# Patient Record
Sex: FEMALE | Race: WHITE | NOT HISPANIC OR LATINO | Employment: FULL TIME | URBAN - METROPOLITAN AREA
[De-identification: names, ages, dates, MRNs, and addresses within clinical notes are randomized per-mention and may not be internally consistent; named-entity substitution may affect disease eponyms.]

---

## 2017-06-03 ENCOUNTER — NON-PROVIDER VISIT (OUTPATIENT)
Dept: OCCUPATIONAL MEDICINE | Facility: CLINIC | Age: 32
End: 2017-06-03

## 2017-06-03 DIAGNOSIS — Z02.1 DRUG TESTING, PRE-EMPLOYMENT: ICD-10-CM

## 2017-06-03 PROCEDURE — 8907 PR URINE COLLECT ONLY: Performed by: PREVENTIVE MEDICINE

## 2017-06-03 NOTE — MR AVS SNAPSHOT
Celeste Hernandez   6/3/2017 1:05 PM   Non-Provider Visit   MRN: [id number]    Department:  [facility]   Dept Phone:  [phone]    Description:  Female : 1985   Provider:  GEGE MEJIA MA           Reason for Visit     Drug / Alcohol Assessment RIDE RIGHT      Allergies as of 6/3/2017     Allergen Noted Reactions    Contrast Media With Iodine [Iodine] 10/12/2009   Rash      You were diagnosed with     Drug testing, pre-employment   [185715]         Vital Signs     Smoking Status                   Current Every Day Smoker           Basic Information     Date Of Birth Sex Race Ethnicity Preferred Language    1985 Female White Non- English      Your appointments     2017  1:05 PM   Occupational Health Drug and Alcohol Testing with [facility] NON [facility] NURSE   [facility] Occupational [address]  [place] [id number] [phone]            2017  1:00 PM   Non Provider 1 with Ananda Sosa D.O.   [address]  [place] [id number] [phone]           You will be receiving a confirmation call a few days before your appointment from our automated call confirmation system.              Health Maintenance     Patient has no pending health maintenance at this time      Current Immunizations     No immunizations on file.      Below and/or attached are the medications your provider expects you to take. Review all of your home medications and newly ordered medications with your provider and/or pharmacist. Follow medication instructions as directed by your provider and/or pharmacist. Please keep your medication list with you and share with your provider. Update the information when medications are discontinued, doses are changed, or new medications (including over-the-counter products) are added; and carry medication information at all times in the event of emergency situations        Allergies:  CONTRAST MEDIA WITH IODINE - Rash               Medications  Valid as of: June 03, 2017 - 12:42 PM    Generic Name Brand Name Tablet Size Instructions for use    Hydrocodone-Acetaminophen (Tab) NORCO 5-325 MG Take 1-2 Tabs by mouth every four hours as needed.        .                 Medicines prescribed today were sent to:     None      Medication refill instructions:       If your prescription bottle indicates you have medication refills left, it is not necessary to call your provider’s office. Please contact your pharmacy and they will refill your medication.    If your prescription bottle indicates you do not have any refills left, you may request refills at any time through one of the following ways: The online Tetra Tech system (except Urgent Care), by calling your provider’s office, or by asking your pharmacy to contact your provider’s office with a refill request. Medication refills are processed only during regular business hours and may not be available until the next business day. Your provider may request additional information or to have a follow-up visit with you prior to refilling your medication.   *Please Note: Medication refills are assigned a new Rx number when refilled electronically. Your pharmacy may indicate that no refills were authorized even though a new prescription for the same medication is available at the pharmacy. Please request the medicine by name with the pharmacy before contacting your provider for a refill.           Tetra Tech Access Code: [email or web address]  Expires: 7/3/2017 12:42 PM    Your email address is not on file at [facility].  Email Addresses are required for you to sign up for Tetra Tech, please contact [phone] to verify your personal information and to provide your email address prior to attempting to register for Tetra Tech.    Celeste Hernandez  [address] [facility] [place] [place] [address]    Tetra Tech  A secure, online tool to manage your health information      [employer]’s Duetto® is a secure, online tool that connects you to your personalized health information from the privacy of your home -- day or night - making it very easy for you to manage your healthcare. Once the activation process is completed, you can even access your medical information using the Amityt don, which is available for free in the Apple Don store or Google Play store.     To learn more about Duetto, visit [email or web address]    There are two levels of access available (as shown below):   My Chart Features  [facility] Primary Care Doctor [facility]  Specialists [facility]  Urgent  Care Non-[facility] Primary Care Doctor   Email your healthcare team securely and privately 24/7 X X X    Manage appointments: schedule your next appointment; view details of past/upcoming appointments X      Request prescription refills. X      View recent personal medical records, including lab and immunizations X X X X   View health record, including health history, allergies, medications X X X X   Read reports about your outpatient visits, procedures, consult and ER notes X X X X   See your discharge summary, which is a recap of your hospital and/or ER visit that includes your diagnosis, lab results, and care plan X X  X     How to register for Duetto:  Once your e-mail address has been verified, follow the following steps to sign up for Duetto.     1. Go to  [email or web address]  2. Click on the Sign Up Now box, which takes you to the New Member Sign Up page. You will need to provide the following information:  a. Enter your Duetto Access Code exactly as it appears at the top of this page. (You will not need to use this code after you’ve completed the sign-up process. If you do not sign up before the expiration date, you must request a new code.)   b. Enter your date of birth.   c. Enter your home email address.   d. Click Submit, and follow the next screen’s instructions.  3. Create a Duetto ID. This will be  your [employer] login ID and cannot be changed, so think of one that is secure and easy to remember.  4. Create a Zympi password. You can change your password at any time.  5. Enter your Password Reset Question and Answer. This can be used at a later time if you forget your password.   6. Enter your e-mail address. This allows you to receive e-mail notifications when new information is available in Zympi.  7. Click Sign Up. You can now view your health information.    For assistance activating your Zympi account, call [phone]

## 2017-06-09 ENCOUNTER — NON-PROVIDER VISIT (OUTPATIENT)
Dept: OCCUPATIONAL MEDICINE | Facility: CLINIC | Age: 32
End: 2017-06-09

## 2017-06-09 DIAGNOSIS — Z02.1 PHYSICAL EXAM, PRE-EMPLOYMENT: ICD-10-CM

## 2017-06-09 PROCEDURE — 8915 PR COMPREHENSIVE PHYSICAL: Performed by: PREVENTIVE MEDICINE

## 2017-06-09 NOTE — MR AVS SNAPSHOT
Celeste BATISTA David   2017 1:00 PM   Non-Provider Visit   MRN: [id number]    Department:  [facility]   Dept Phone:  [phone]    Description:  Female : 1985   Provider:  Annada Sosa D.O.           Reason for Visit     Other           Allergies as of 2017     Allergen Noted Reactions    Contrast Media With Iodine [Iodine] 10/12/2009   Rash      You were diagnosed with     Physical exam, pre-employment   [629423]         Vital Signs     Smoking Status                   Current Every Day Smoker           Basic Information     Date Of Birth Sex Race Ethnicity Preferred Language    1985 Female White Non- English      Health Maintenance     Patient has no pending health maintenance at this time      Current Immunizations     No immunizations on file.      Below and/or attached are the medications your provider expects you to take. Review all of your home medications and newly ordered medications with your provider and/or pharmacist. Follow medication instructions as directed by your provider and/or pharmacist. Please keep your medication list with you and share with your provider. Update the information when medications are discontinued, doses are changed, or new medications (including over-the-counter products) are added; and carry medication information at all times in the event of emergency situations     Allergies:  CONTRAST MEDIA WITH IODINE - Rash               Medications  Valid as of: 2017 -  3:59 PM    Generic Name Brand Name Tablet Size Instructions for use    Hydrocodone-Acetaminophen (Tab) NORCO 5-325 MG Take 1-2 Tabs by mouth every four hours as needed.        .                 Medicines prescribed today were sent to:     None      Medication refill instructions:       If your prescription bottle indicates you have medication refills left, it is not necessary to call your provider’s office. Please contact your pharmacy and they will refill your  medication.    If your prescription bottle indicates you do not have any refills left, you may request refills at any time through one of the following ways: The online LendingRobot system (except Urgent Care), by calling your provider’s office, or by asking your pharmacy to contact your provider’s office with a refill request. Medication refills are processed only during regular business hours and may not be available until the next business day. Your provider may request additional information or to have a follow-up visit with you prior to refilling your medication.   *Please Note: Medication refills are assigned a new Rx number when refilled electronically. Your pharmacy may indicate that no refills were authorized even though a new prescription for the same medication is available at the pharmacy. Please request the medicine by name with the pharmacy before contacting your provider for a refill.           LendingRobot Access Code: [email or web address]  Expires: 7/3/2017 12:42 PM    Your email address is not on file at [facility].  Email Addresses are required for you to sign up for LendingRobot, please contact [phone] to verify your personal information and to provide your email address prior to attempting to register for LendingRobot.    Celeste Hernandez  [address] [place] [address]    LendingRobot  A secure, online tool to manage your health information     [employer]’s LendingRobot® is a secure, online tool that connects you to your personalized health information from the privacy of your home -- day or night - making it very easy for you to manage your healthcare. Once the activation process is completed, you can even access your medical information using the LendingRobot don, which is available for free in the Apple Don store or Google Play store.     To learn more about LendingRobot, visit [email or web address]    There are two levels of access available (as shown below):   My Chart Features  [facility] Primary Care Doctor  [facility]  Specialists [facility]  Urgent  Care Non-[facility] Primary Care Doctor   Email your healthcare team securely and privately 24/7 X X X    Manage appointments: schedule your next appointment; view details of past/upcoming appointments X      Request prescription refills. X      View recent personal medical records, including lab and immunizations X X X X   View health record, including health history, allergies, medications X X X X   Read reports about your outpatient visits, procedures, consult and ER notes X X X X   See your discharge summary, which is a recap of your hospital and/or ER visit that includes your diagnosis, lab results, and care plan X X  X     How to register for Bambeco:  Once your e-mail address has been verified, follow the following steps to sign up for Bambeco.     1. Go to  [email or web address]  2. Click on the Sign Up Now box, which takes you to the New Member Sign Up page. You will need to provide the following information:  a. Enter your Bambeco Access Code exactly as it appears at the top of this page. (You will not need to use this code after you’ve completed the sign-up process. If you do not sign up before the expiration date, you must request a new code.)   b. Enter your date of birth.   c. Enter your home email address.   d. Click Submit, and follow the next screen’s instructions.  3. Create a Bambeco ID. This will be your Bambeco login ID and cannot be changed, so think of one that is secure and easy to remember.  4. Create a Bambeco password. You can change your password at any time.  5. Enter your Password Reset Question and Answer. This can be used at a later time if you forget your password.   6. Enter your e-mail address. This allows you to receive e-mail notifications when new information is available in Bambeco.  7. Click Sign Up. You can now view your health information.    For assistance activating your Bambeco account, call [phone]

## 2018-04-17 ENCOUNTER — HOSPITAL ENCOUNTER (EMERGENCY)
Facility: MEDICAL CENTER | Age: 33
End: 2018-04-17
Attending: EMERGENCY MEDICINE

## 2018-04-17 VITALS
DIASTOLIC BLOOD PRESSURE: 80 MMHG | HEART RATE: 97 BPM | OXYGEN SATURATION: 98 % | BODY MASS INDEX: 25.47 KG/M2 | HEIGHT: 66 IN | RESPIRATION RATE: 16 BRPM | SYSTOLIC BLOOD PRESSURE: 120 MMHG | WEIGHT: 158.51 LBS | TEMPERATURE: 97.6 F

## 2018-04-17 DIAGNOSIS — R42 LIGHTHEADEDNESS: ICD-10-CM

## 2018-04-17 DIAGNOSIS — A63.0 ANAL WARTS: ICD-10-CM

## 2018-04-17 LAB
ALBUMIN SERPL BCP-MCNC: 4.4 G/DL (ref 3.2–4.9)
ALBUMIN/GLOB SERPL: 1.4 G/DL
ALP SERPL-CCNC: 36 U/L (ref 30–99)
ALT SERPL-CCNC: 7 U/L (ref 2–50)
ANION GAP SERPL CALC-SCNC: 12 MMOL/L (ref 0–11.9)
AST SERPL-CCNC: 13 U/L (ref 12–45)
BASOPHILS # BLD AUTO: 0.6 % (ref 0–1.8)
BASOPHILS # BLD: 0.07 K/UL (ref 0–0.12)
BILIRUB SERPL-MCNC: 0.5 MG/DL (ref 0.1–1.5)
BUN SERPL-MCNC: 7 MG/DL (ref 8–22)
CALCIUM SERPL-MCNC: 9.1 MG/DL (ref 8.5–10.5)
CHLORIDE SERPL-SCNC: 105 MMOL/L (ref 96–112)
CO2 SERPL-SCNC: 22 MMOL/L (ref 20–33)
CREAT SERPL-MCNC: 0.79 MG/DL (ref 0.5–1.4)
EOSINOPHIL # BLD AUTO: 0.11 K/UL (ref 0–0.51)
EOSINOPHIL NFR BLD: 0.9 % (ref 0–6.9)
ERYTHROCYTE [DISTWIDTH] IN BLOOD BY AUTOMATED COUNT: 44.1 FL (ref 35.9–50)
GLOBULIN SER CALC-MCNC: 3.2 G/DL (ref 1.9–3.5)
GLUCOSE SERPL-MCNC: 109 MG/DL (ref 65–99)
HCT VFR BLD AUTO: 41.2 % (ref 37–47)
HGB BLD-MCNC: 13.8 G/DL (ref 12–16)
IMM GRANULOCYTES # BLD AUTO: 0.03 K/UL (ref 0–0.11)
IMM GRANULOCYTES NFR BLD AUTO: 0.3 % (ref 0–0.9)
LYMPHOCYTES # BLD AUTO: 4.09 K/UL (ref 1–4.8)
LYMPHOCYTES NFR BLD: 35 % (ref 22–41)
MCH RBC QN AUTO: 30.9 PG (ref 27–33)
MCHC RBC AUTO-ENTMCNC: 33.5 G/DL (ref 33.6–35)
MCV RBC AUTO: 92.4 FL (ref 81.4–97.8)
MONOCYTES # BLD AUTO: 0.75 K/UL (ref 0–0.85)
MONOCYTES NFR BLD AUTO: 6.4 % (ref 0–13.4)
NEUTROPHILS # BLD AUTO: 6.64 K/UL (ref 2–7.15)
NEUTROPHILS NFR BLD: 56.8 % (ref 44–72)
NRBC # BLD AUTO: 0 K/UL
NRBC BLD-RTO: 0 /100 WBC
PLATELET # BLD AUTO: 207 K/UL (ref 164–446)
PMV BLD AUTO: 9.7 FL (ref 9–12.9)
POTASSIUM SERPL-SCNC: 3.1 MMOL/L (ref 3.6–5.5)
PROT SERPL-MCNC: 7.6 G/DL (ref 6–8.2)
RBC # BLD AUTO: 4.46 M/UL (ref 4.2–5.4)
SODIUM SERPL-SCNC: 139 MMOL/L (ref 135–145)
WBC # BLD AUTO: 11.7 K/UL (ref 4.8–10.8)

## 2018-04-17 PROCEDURE — 93005 ELECTROCARDIOGRAM TRACING: CPT | Performed by: EMERGENCY MEDICINE

## 2018-04-17 PROCEDURE — 85025 COMPLETE CBC W/AUTO DIFF WBC: CPT

## 2018-04-17 PROCEDURE — 80053 COMPREHEN METABOLIC PANEL: CPT

## 2018-04-17 PROCEDURE — 36415 COLL VENOUS BLD VENIPUNCTURE: CPT

## 2018-04-17 PROCEDURE — 99284 EMERGENCY DEPT VISIT MOD MDM: CPT

## 2018-04-17 RX ORDER — IMIQUIMOD 37.5 MG/G
1 CREAM TOPICAL DAILY
Qty: 16 G | Refills: 0 | Status: SHIPPED | OUTPATIENT
Start: 2018-04-17 | End: 2018-05-01

## 2018-04-17 NOTE — ED TRIAGE NOTES
Celeste Hernandez 33 y.o. female   Ambulatory to triage.     Chief Complaint   Patient presents with   • Lightheadedness     Stood up quickly at work.  Returned to seated position.  Lightheadedness resolved now.     • Rectal Pain     Pain with bowel movement.  Small amount of blood on TP x1 month.        Pt returned to lobby and educated on triage process.  Advised to notify RN with changes or concerns.

## 2018-04-17 NOTE — ED PROVIDER NOTES
ED Provider Note    ER PROVIDER NOTE        CHIEF COMPLAINT  Chief Complaint   Patient presents with   • Lightheadedness     Stood up quickly at work.  Returned to seated position.  Lightheadedness resolved now.     • Rectal Pain     Pain with bowel movement.  Small amount of blood on TP x1 month.       HPI  Celeste Hernandez is a 33 y.o. female who presents to the emergency department complaining of lightheadedness. Patient reports that she was at work today, she stood up quickly, she began to feel lightheaded and shaky she return to sitting position and this resolved. No syncope. No current prodromal chest pain or shortness of breath, no headaches, abdominal pain. No recent illness, fevers chills abdominal pain nausea vomiting. She states she has been eating and drinking well although does not typically eat breakfast. She was at work so coworkers called EMS. She's had no prior similar episodes.  She denies any dark tarry stool or blood in her stool though they does state over the last few months has had pain with bowel movements as well as a small amount of blood on the toilet paper when she wipes occasionally    REVIEW OF SYSTEMS  Pertinent positives include lightheadedness. Pertinent negatives include no chest pain. See HPI for details. All other systems reviewed and are negative.    PAST MEDICAL HISTORY       SURGICAL HISTORY   has a past surgical history that includes gyn surgery (10/09/09).    FAMILY HISTORY  No family history on file.    SOCIAL HISTORY  Social History     Social History   • Marital status: Single     Spouse name: N/A   • Number of children: N/A   • Years of education: N/A     Social History Main Topics   • Smoking status: Current Every Day Smoker     Packs/day: 1.00     Years: 18.00   • Smokeless tobacco: Not on file      Comment: 8 cigarettes day for 10 years   • Alcohol use No   • Drug use: No   • Sexual activity: Not on file     Other Topics Concern   • Not on file     Social History  "Narrative   • No narrative on file      History   Drug Use No       CURRENT MEDICATIONS  Home Medications    **Home medications have not yet been reviewed for this encounter**         ALLERGIES  No Active Allergies    PHYSICAL EXAM  VITAL SIGNS: /80   Pulse 97   Temp 36.4 °C (97.6 °F) (Temporal)   Resp 16   Ht 1.676 m (5' 6\")   Wt 71.9 kg (158 lb 8.2 oz)   SpO2 98%   BMI 25.58 kg/m²   Pulse ox interpretation: I interpret this pulse ox as normal.    Constitutional: Alert in no apparent distress.  HENT: No signs of trauma, Bilateral external ears normal, Nose normal.   Eyes: Pupils are equal and reactive, Conjunctiva normal, Non-icteric.   Neck: Normal range of motion, No tenderness, Supple, No stridor.   Lymphatic: No lymphadenopathy noted.   Cardiovascular: Regular rate and rhythm, no murmurs.   Thorax & Lungs: Normal breath sounds, No respiratory distress, No wheezing, No chest tenderness.   Abdomen: Bowel sounds normal, Soft, No tenderness, No masses, No pulsatile masses. No peritoneal signs.  Skin: Warm, Dry, No erythema, No rash.   Back: No bony tenderness, No CVA tenderness.   Extremities: Intact distal pulses, No edema, No tenderness, No cyanosis, Negative Laureen's sign.  Musculoskeletal: Good range of motion in all major joints. No tenderness to palpation or major deformities noted.   Neurologic: Alert , Normal motor function, Normal sensory function, No focal deficits noted.   Psychiatric: Affect normal, Judgment normal, Mood normal.     Rectal exam/anoscopy performed with nurse chaperone. Multiple lesions consistent with perianal warts, no hemorrhoid or fissure    DIAGNOSTIC STUDIES / PROCEDURES    EKG  Interpreted by me    Rhythm:  Normal sinus rhythm   Rate: 83  Axis: normal  Intervals: normal  Ectopy: none  Conduction: normal  ST Segments: no acute change  T Waves: no acute change  Q Waves: none      LABS  Labs Reviewed   CBC WITH DIFFERENTIAL - Abnormal; Notable for the following:        " Result Value    WBC 11.7 (*)     MCHC 33.5 (*)     All other components within normal limits   COMP METABOLIC PANEL - Abnormal; Notable for the following:     Potassium 3.1 (*)     Anion Gap 12.0 (*)     Glucose 109 (*)     Bun 7 (*)     All other components within normal limits   ESTIMATED GFR       All labs reviewed by me.    RADIOLOGY  No orders to display     The radiologist's interpretation of all radiological studies have been reviewed by me.    COURSE & MEDICAL DECISION MAKING  Nursing notes, VS, PMSFHx reviewed in chart.    4:28 PM Patient seen and examined at bedside. . Ordered for CBC, CMP, ECG to evaluate her symptoms. Patient is not pregnant she has had a tubal ligation    6:40 PM patient reevaluated, update a result, states she is feeling normal at this time      Decision Making:  This is a 33 y.o. female presented to complaints #1 was lightheadedness. No neurologic signs/symptoms were present to suggest a neurogenic cause such as CVA/TIA. There was no witnessed seizure activity or history or residual neuro deficit to suggest seizure. The patient has no valvular abnormality on my examination to suggest valvular cause or hypertrophic cardiomyopathy. The ekg does not show any evidence of arrythmia, prolonged intervals, early excitation, or other abnormality such as an accessory pathway, qt prolongation, or brugada syndrome. ACS or MI are unlikely causes given nature of sx, unremarkable ECG and given the patients improvement the likely of acs of mi is very low. Other cardiac causes are also unlikely based on my history and physical examination. Serious bacterial illness was considered but ruled out by history and physical exam.   There is no evidence of metabolic abnormalities to explain this episode on labs.  As the patient is now improved there is no evidence of emergent toxic, metabolic, or endocrine abnormalities.    Regarding her rectal complaints, she does have evidence of anal warts. Have written  her prescription for Imiquimod which she requested be electronically sent to the pharmacy which I done. [facility]   The patient will return for new or worsening symptoms and is stable at the time of discharge.    The patient is referred to a primary physician for blood pressure management, diabetic screening, and for all other preventative health concerns.      DISPOSITION:  Patient will be discharged home in stable condition.    FOLLOW UP:  [address] [address]  [phone]  In 2 weeks        OUTPATIENT MEDICATIONS:  Discharge Medication List as of 4/17/2018  6:43 PM            FINAL IMPRESSION  1. Lightheadedness    2. Anal warts         The note accurately reflects work and decisions made by me.  Jamal Freire  4/17/2018  7:57 PM

## 2018-04-18 NOTE — DISCHARGE INSTRUCTIONS
Near-Syncope  Introduction  Near-syncope is when you suddenly get weak or dizzy, or you feel like you might pass out (faint). During an episode of near-syncope, you may:  · Feel dizzy or light-headed.  · Feel sick to your stomach (nauseous).  · See all white or all black.  · Have cold, clammy skin.  If you passed out, get help right away.Call your local emergency services (791 in the [place] Do not drive yourself to the hospital.  Follow these instructions at home:  Pay attention to any changes in your symptoms. Take these actions to help with your condition:  · Have someone stay with you until you feel stable.  · Do not drive, use machinery, or play sports until your doctor says it is okay.  · Keep all follow-up visits as told by your doctor. This is important.  · If you start to feel like you might pass out, lie down right away and raise (elevate) your feet above the level of your heart. Breathe deeply and steadily. Wait until all of the symptoms are gone.  · Drink enough fluid to keep your pee (urine) clear or pale yellow.  · If you are taking blood pressure or heart medicine, get up slowly and spend many minutes getting ready to sit and then stand. This can help with dizziness.  · Take over-the-counter and prescription medicines only as told by your doctor.  Get help right away if:  · You have a very bad headache.  · You have unusual pain in your chest, tummy, or back.  · You are bleeding from your mouth or rectum.  · You have black or tarry poop (stool).  · You have a very fast or uneven heartbeat (palpitations).  · You pass out one time or more than once.  · You have jerky movements that you cannot control (seizure).  · You are confused.  · You have trouble walking.  · You are very weak.  · You have vision problems.  These symptoms may be an emergency. Do not wait to see if the symptoms will go away. Get medical help right away. Call your local emergency services (931 in the [place] Do not drive yourself to  the hospital.   This information is not intended to replace advice given to you by your health care provider. Make sure you discuss any questions you have with your health care provider.  Document Released: 06/05/2009 Document Revised: 05/25/2017 Document Reviewed: 08/31/2016  © 2017 Elsevier

## 2018-04-18 NOTE — ED NOTES
VSS.  Discharge instructions and (prescriptions electronically sent to pharmacy) given- verbalizes understanding.   Ambulatory to lobby with steady gait.

## 2018-04-22 LAB — EKG IMPRESSION: NORMAL

## 2021-02-15 ENCOUNTER — OFFICE VISIT (OUTPATIENT)
Dept: URGENT CARE | Facility: CLINIC | Age: 36
End: 2021-02-15

## 2021-02-15 ENCOUNTER — NON-PROVIDER VISIT (OUTPATIENT)
Dept: URGENT CARE | Facility: CLINIC | Age: 36
End: 2021-02-15

## 2021-02-15 DIAGNOSIS — Z02.83 ENCOUNTER FOR DRUG SCREENING: ICD-10-CM

## 2021-02-15 PROCEDURE — 8907 PR URINE COLLECT ONLY: Performed by: PHYSICIAN ASSISTANT

## 2022-05-23 ENCOUNTER — NON-PROVIDER VISIT (OUTPATIENT)
Dept: OCCUPATIONAL MEDICINE | Facility: CLINIC | Age: 37
End: 2022-05-23

## 2022-05-23 DIAGNOSIS — Z02.89 VISIT FOR OCCUPATIONAL HEALTH EXAMINATION: ICD-10-CM

## 2022-05-23 PROCEDURE — 8907 PR URINE COLLECT ONLY: Performed by: NURSE PRACTITIONER
